# Patient Record
Sex: FEMALE | Race: WHITE | Employment: FULL TIME | URBAN - METROPOLITAN AREA
[De-identification: names, ages, dates, MRNs, and addresses within clinical notes are randomized per-mention and may not be internally consistent; named-entity substitution may affect disease eponyms.]

---

## 2019-04-08 ENCOUNTER — TELEPHONE (OUTPATIENT)
Dept: ADMINISTRATIVE | Age: 44
End: 2019-04-08

## 2019-04-08 NOTE — TELEPHONE ENCOUNTER
Pt is a self-referral and former [facility] pt. Last seen 2015. Pt called to schedule an appt with Dr. Lucie Ahmadi (at her request) . Has a defibulator and has wore a life vest in the past.  States she was diagnosed with CHF 3 yrs ago. Pt was scheduled with on 5/14/19 @ 8:30. Past cardiac hx form scanned.

## 2019-05-14 ENCOUNTER — OFFICE VISIT (OUTPATIENT)
Dept: CARDIOLOGY CLINIC | Age: 44
End: 2019-05-14
Payer: COMMERCIAL

## 2019-05-14 VITALS
SYSTOLIC BLOOD PRESSURE: 136 MMHG | DIASTOLIC BLOOD PRESSURE: 88 MMHG | BODY MASS INDEX: 29.23 KG/M2 | HEIGHT: 63 IN | HEART RATE: 67 BPM | WEIGHT: 165 LBS

## 2019-05-14 DIAGNOSIS — I42.0 NONISCHEMIC DILATED CARDIOMYOPATHY (HCC): Primary | ICD-10-CM

## 2019-05-14 DIAGNOSIS — E66.3 OVER WEIGHT: ICD-10-CM

## 2019-05-14 DIAGNOSIS — Z95.810 ICD (IMPLANTABLE CARDIOVERTER-DEFIBRILLATOR) IN PLACE: ICD-10-CM

## 2019-05-14 PROCEDURE — 99204 OFFICE O/P NEW MOD 45 MIN: CPT | Performed by: INTERNAL MEDICINE

## 2019-05-14 PROCEDURE — 93000 ELECTROCARDIOGRAM COMPLETE: CPT | Performed by: INTERNAL MEDICINE

## 2019-05-14 RX ORDER — MULTIVIT-MIN/IRON/FOLIC ACID/K 18-600-40
2000 CAPSULE ORAL
COMMUNITY

## 2019-05-14 NOTE — PROGRESS NOTES
OFFICE VISIT     PRIMARY CARE PHYSICIAN:      Dilshad Bates DO       ALLERGIES / SENSITIVITIES:      No Known Allergies       REVIEWED MEDICATIONS:        Current Outpatient Medications:     Cholecalciferol (VITAMIN D) 2000 units CAPS capsule, Take 2,000 Units by mouth, Disp: , Rfl:     furosemide (LASIX) 20 MG tablet, Take 1 tablet by mouth daily, Disp: 10 tablet, Rfl: 0    potassium chloride SA (K-DUR;KLOR-CON M) 20 MEQ tablet, Take 1 tablet by mouth daily, Disp: 10 tablet, Rfl: 0    lisinopril (PRINIVIL;ZESTRIL) 2.5 MG tablet, Take 1 tablet by mouth daily (Patient taking differently: Take 10 mg by mouth daily ), Disp: 30 tablet, Rfl: 3    carvedilol (COREG) 6.25 MG tablet, Take 1 tablet by mouth 2 times daily (with meals) (Patient taking differently: Take 25 mg by mouth 2 times daily (with meals) ), Disp: 60 tablet, Rfl: 3    spironolactone (ALDACTONE) 25 MG tablet, Take 0.5 tablets by mouth daily, Disp: 30 tablet, Rfl: 3      S: REASON FOR VISIT:       Chief Complaint   Patient presents with    Congestive Heart Failure     Self referral due to CHF. Pt has ICD, former Dr. Lamar Tomas. Last seen 05/15/15. Wanted to switch. Pt has no cardiac complaints today. Pt just wants to establish here. History of Present Illness:       New referral for CMP, HTN   Had NICMP, 2015, had Live Vest,; s/p Sub-Q  ICD at [facility]   occ mild HALE  No PND or orthopnea   No palpitations, dizzy or syncope.    Active at home   No orthopnea   Try to watch diet   Compliant with all medications       Past Medical History:   Diagnosis Date    [facility]congestive [facility] failure[facility])     echo 5/13/15 EF 18% stage II diastolic    Migraine     Nonischemic dilated cardiomyopathy ([facility] [address] 2015            Past Surgical History:   Procedure Laterality Date    CARDIAC DEFIBRILLATOR PLACEMENT            Family History   Problem Relation Age of Onset    Hypertension Mother     Heart Attack Father           Social History     Tobacco Use    Smoking status: Former Smoker    Smokeless tobacco: Never Used    Tobacco comment: quit 2004   Substance Use Topics    Alcohol use: No     Comment: 12 oz mtn dew daily         Review of Systems:  HEENT: negative for acute visual symptoms or auditory problems, no dysphagia  Constitutional: negative for fever and chills, or significant weight loss  Respiratory: negative for cough, wheezing, or hemoptysis  Cardiovascular: negative for chest pain, palpitations, and +ve dyspnea  Gastrointestinal: negative for abdominal pain, diarrhea, nausea and vomiting  Endocrine: Negative for polyuria and polydyspsia  Genitourinary:negative for dysuria and hematuria  Derm: negative for rash and skin lesion(s)  Neurological: negative for tingling, numbness, weakness, seizures and tremors  Endocrine: negative for polydipsia and polyuria  Musculoskeletal: negative for pain or tenderness  Psychiatric: negative for anxiety, depression, or suicidal ideations         O:  COMPLETE PHYSICAL EXAM:       /88   Pulse 67   Ht 5' 3\" (1.6 m)   Wt 165 lb (74.8 kg)   BMI 29.23 kg/m²       General:   Patient alert, comfortable, no distress. Appears stated age. HEENT:    Pupils equal, no icterus, no nasal drainage, tongue moist.   Neck:              No masses, Thyroid not palpable. Chest:   Normal configuration, non tender. Left mid axillary area Sub-Q ICD +   Lungs:   Clear to auscultation bilaterally, few scattered rhonchi. Cardiovascular:  Regular rhythm, 1/6 systolic murmur, No S3, , no palpable thrills, No elevated JVD, No carotid bruit. Abdomen:  Soft, Non tender, Bowel sounds normal, no pulsatile abdominal aorta, no palpable masses. Extremities:  No edema. Distal pulses palpable. No cyanosis, no clubbing. Skin:   Good turgor, warm and dry, no cyanosis. Musculoskeletal: No joint swelling or deformity. Neuro:   Cranial nerves grossly intact; No focal neurologic deficit.    Psych:   Alert, good mood and effect. REVIEW OF DIAGNOSTIC TESTS:        Electrocardiogram: NSR, anterior ST/T leon   Echo 3/2019   Stress 2016          A/P:   ASSESSMENT / PLAN:    Yolis Vyas was seen today for congestive heart failure. Diagnoses and all orders for this visit:    Nonischemic dilated cardiomyopathy ([facility] [address] - Dx in  2015, EF 20-25% in 8/2015, 25% in 2016, 53 +/-5% on 3/4/2019 Echo - On Coreg, Lisinopril, Aldactone, prn Lasix    ICD (implantable cardioverter-defibrillator) in place 9/25/2015 -[facility]- S-ICD 1 - Follows with Dr Clifford Woods at [facility]    Possible HTN - Stable on current Meds    Over weight - Diet, exercise and weight loss discussed. Other orders  -     EKG 12 Lead       Preventive Cardiology: Low cholesterol diet, regular exercise as tolerate, and gradual weight loss discussed. Monitor BP and heart rates. All questions answered about cardiac diagnoses and cardiac medications. Continue current medications. Compliance with medications and f/u with all physicians discussed. Risk factor modification based on risk profile discussed. Call if any exertional chest pain, short of breath, dizzy or palpitations   Follow up in 12 months or earlier if needed.          [id number] [facility]  [address], [place], [address] [facility]  [phone]

## 2019-08-18 ENCOUNTER — APPOINTMENT (OUTPATIENT)
Dept: GENERAL RADIOLOGY | Age: 44
End: 2019-08-18
Payer: COMMERCIAL

## 2019-08-18 ENCOUNTER — HOSPITAL ENCOUNTER (EMERGENCY)
Age: 44
Discharge: HOME | End: 2019-08-18
Payer: COMMERCIAL

## 2019-08-18 VITALS
RESPIRATION RATE: 16 BRPM | HEART RATE: 70 BPM | TEMPERATURE: 98.3 F | WEIGHT: 165 LBS | DIASTOLIC BLOOD PRESSURE: 75 MMHG | OXYGEN SATURATION: 98 % | SYSTOLIC BLOOD PRESSURE: 128 MMHG | BODY MASS INDEX: 29.23 KG/M2

## 2019-08-18 DIAGNOSIS — S83.90XA SPRAIN OF KNEE, UNSPECIFIED LATERALITY, UNSPECIFIED LIGAMENT, INITIAL ENCOUNTER: Primary | ICD-10-CM

## 2019-08-18 PROCEDURE — 99212 OFFICE O/P EST SF 10 MIN: CPT

## 2019-08-18 PROCEDURE — 73560 X-RAY EXAM OF KNEE 1 OR 2: CPT

## 2019-08-18 RX ORDER — NAPROXEN 500 MG/1
500 TABLET ORAL 2 TIMES DAILY
Qty: 14 TABLET | Refills: 0 | Status: SHIPPED | OUTPATIENT
Start: 2019-08-18 | End: 2020-06-30

## 2019-08-18 ASSESSMENT — PAIN DESCRIPTION - PAIN TYPE: TYPE: ACUTE PAIN

## 2019-08-18 ASSESSMENT — PAIN SCALES - GENERAL: PAINLEVEL_OUTOF10: 5

## 2019-08-18 ASSESSMENT — PAIN DESCRIPTION - DESCRIPTORS: DESCRIPTORS: ACHING

## 2019-08-18 ASSESSMENT — PAIN DESCRIPTION - ORIENTATION: ORIENTATION: RIGHT

## 2019-08-18 ASSESSMENT — PAIN DESCRIPTION - LOCATION: LOCATION: KNEE

## 2019-09-09 RX ORDER — LISINOPRIL 10 MG/1
10 TABLET ORAL DAILY
Qty: 90 TABLET | Refills: 3 | Status: SHIPPED | OUTPATIENT
Start: 2019-09-09 | End: 2020-09-03

## 2019-09-09 RX ORDER — SPIRONOLACTONE 25 MG/1
12.5 TABLET ORAL DAILY
Qty: 45 TABLET | Refills: 3 | Status: SHIPPED | OUTPATIENT
Start: 2019-09-09 | End: 2020-11-16

## 2019-09-09 RX ORDER — CARVEDILOL 25 MG/1
25 TABLET ORAL 2 TIMES DAILY WITH MEALS
Qty: 180 TABLET | Refills: 3 | Status: SHIPPED | OUTPATIENT
Start: 2019-09-09 | End: 2020-11-10

## 2019-09-26 ENCOUNTER — EVALUATION (OUTPATIENT)
Dept: PHYSICAL THERAPY | Age: 44
End: 2019-09-26
Payer: COMMERCIAL

## 2019-09-26 DIAGNOSIS — S83.90XA SPRAIN OF KNEE, INITIAL ENCOUNTER: Primary | ICD-10-CM

## 2019-09-26 PROCEDURE — 97163 PT EVAL HIGH COMPLEX 45 MIN: CPT | Performed by: PHYSICAL THERAPIST

## 2019-09-26 NOTE — PROGRESS NOTES
Therapeutic Exercise  [x] Therapeutic Activity  [x] Neuromuscular Re-education   [] Gait Training  [] Balance Training  [] Aerobic conditioning  [] Manual Therapy  [] Massage/Fascial release   [x] Work/Sport specific activities    [] Pain Neuroscience [x] Cold/hotpack  [] Vasocompression  [] Electrical Stimulation  [] Lumbar/Cervical Traction  [] Ultrasound   [] Iontophoresis: 4 mg/mL Dexamethasone Sodium Phosphate 40-80 mAmin        [x] Instruction in HEP      []  Medication allergies reviewed for use of Dexamethasone Sodium Phosphate 4mg/ml  with iontophoresis treatments. Patient is not allergic. The following CPT codes are likely to be used in the care of this patient: 96302 Therapeutic Exercise , 83502 Therapeutic Activities       Suggested Professional Referral: [x] No  [] Yes:     Patient Education:  [x] Plans/Goals, Risks/Benefits discussed  [x] Home exercise program  Method of Education: [x] Verbal  [x] Demo  [x] Written  Comprehension of Education:  [x] Verbalizes understanding. [x] Demonstrates understanding. [] Needs Review. [] Demonstrates/verbalizes understanding of HEP/Ed previously given. Frequency: 3 days per week for 4-6 weeks    Patient understands diagnosis/prognosis and consents to treatment, plan and goals: [x] Yes    [] No     Thank you for the opportunity to work with your patient. If you have questions or comments, please contact me at [phone]; fax: [phone]. Electronically signed by: Sera Pena PT         Please sign Physician's Certification and return to: [facility]  [facility] [address]  Dept: [phone]  Dept Fax: [phone] Certification / Comments     Frequency/Duration 3 days per week for 4-6 weeks. Certification period from 9/26/2019  to 12/20/2019.     I have reviewed the Plan of Care established for skilled therapy services and certify that the services are required and that they will be provided while the patient is under my care.     Physician's Comments/Revisions:               Physician's Printed Name:                                           [de-identified] Signature:                                                               Date:

## 2019-09-30 ENCOUNTER — TREATMENT (OUTPATIENT)
Dept: PHYSICAL THERAPY | Age: 44
End: 2019-09-30
Payer: COMMERCIAL

## 2019-09-30 DIAGNOSIS — S83.90XA SPRAIN OF KNEE, INITIAL ENCOUNTER: Primary | ICD-10-CM

## 2019-09-30 PROCEDURE — 97110 THERAPEUTIC EXERCISES: CPT | Performed by: PHYSICAL THERAPIST

## 2019-10-01 ENCOUNTER — TREATMENT (OUTPATIENT)
Dept: PHYSICAL THERAPY | Age: 44
End: 2019-10-01
Payer: COMMERCIAL

## 2019-10-01 DIAGNOSIS — S83.90XA SPRAIN OF KNEE, INITIAL ENCOUNTER: Primary | ICD-10-CM

## 2019-10-01 PROCEDURE — 97110 THERAPEUTIC EXERCISES: CPT | Performed by: PHYSICAL THERAPIST

## 2019-10-03 ENCOUNTER — TREATMENT (OUTPATIENT)
Dept: PHYSICAL THERAPY | Age: 44
End: 2019-10-03
Payer: COMMERCIAL

## 2019-10-03 DIAGNOSIS — S83.90XA SPRAIN OF KNEE, INITIAL ENCOUNTER: Primary | ICD-10-CM

## 2019-10-03 PROCEDURE — 97110 THERAPEUTIC EXERCISES: CPT | Performed by: PHYSICAL THERAPIST

## 2019-10-07 ENCOUNTER — TREATMENT (OUTPATIENT)
Dept: PHYSICAL THERAPY | Age: 44
End: 2019-10-07
Payer: COMMERCIAL

## 2019-10-07 DIAGNOSIS — S83.90XA SPRAIN OF KNEE, INITIAL ENCOUNTER: Primary | ICD-10-CM

## 2019-10-07 PROCEDURE — 97110 THERAPEUTIC EXERCISES: CPT | Performed by: PHYSICAL THERAPIST

## 2019-10-08 ENCOUNTER — TREATMENT (OUTPATIENT)
Dept: PHYSICAL THERAPY | Age: 44
End: 2019-10-08
Payer: COMMERCIAL

## 2019-10-08 DIAGNOSIS — S83.90XA SPRAIN OF KNEE, INITIAL ENCOUNTER: ICD-10-CM

## 2019-10-08 PROCEDURE — 97110 THERAPEUTIC EXERCISES: CPT | Performed by: PHYSICAL THERAPIST

## 2019-10-10 ENCOUNTER — TREATMENT (OUTPATIENT)
Dept: PHYSICAL THERAPY | Age: 44
End: 2019-10-10
Payer: COMMERCIAL

## 2019-10-10 DIAGNOSIS — S83.90XA SPRAIN OF KNEE, INITIAL ENCOUNTER: Primary | ICD-10-CM

## 2019-10-10 PROCEDURE — 97110 THERAPEUTIC EXERCISES: CPT | Performed by: PHYSICAL THERAPIST

## 2019-10-10 PROCEDURE — 97530 THERAPEUTIC ACTIVITIES: CPT | Performed by: PHYSICAL THERAPIST

## 2019-10-15 ENCOUNTER — TELEPHONE (OUTPATIENT)
Dept: PHYSICAL THERAPY | Age: 44
End: 2019-10-15

## 2019-10-17 ENCOUNTER — TREATMENT (OUTPATIENT)
Dept: PHYSICAL THERAPY | Age: 44
End: 2019-10-17
Payer: COMMERCIAL

## 2019-10-17 DIAGNOSIS — S83.90XA SPRAIN OF KNEE, INITIAL ENCOUNTER: Primary | ICD-10-CM

## 2019-10-17 PROCEDURE — 97110 THERAPEUTIC EXERCISES: CPT | Performed by: PHYSICAL THERAPIST

## 2019-10-17 PROCEDURE — 97530 THERAPEUTIC ACTIVITIES: CPT | Performed by: PHYSICAL THERAPIST

## 2019-10-21 ENCOUNTER — TREATMENT (OUTPATIENT)
Dept: PHYSICAL THERAPY | Age: 44
End: 2019-10-21
Payer: COMMERCIAL

## 2019-10-21 DIAGNOSIS — S83.90XA SPRAIN OF KNEE, INITIAL ENCOUNTER: Primary | ICD-10-CM

## 2019-10-21 PROCEDURE — 97110 THERAPEUTIC EXERCISES: CPT | Performed by: PHYSICAL THERAPIST

## 2019-10-21 PROCEDURE — 97530 THERAPEUTIC ACTIVITIES: CPT | Performed by: PHYSICAL THERAPIST

## 2019-10-22 ENCOUNTER — TREATMENT (OUTPATIENT)
Dept: PHYSICAL THERAPY | Age: 44
End: 2019-10-22
Payer: COMMERCIAL

## 2019-10-22 DIAGNOSIS — S83.90XA SPRAIN OF KNEE, INITIAL ENCOUNTER: Primary | ICD-10-CM

## 2019-10-22 PROCEDURE — 97530 THERAPEUTIC ACTIVITIES: CPT | Performed by: PHYSICAL THERAPIST

## 2019-10-22 PROCEDURE — 97110 THERAPEUTIC EXERCISES: CPT | Performed by: PHYSICAL THERAPIST

## 2019-10-24 ENCOUNTER — TREATMENT (OUTPATIENT)
Dept: PHYSICAL THERAPY | Age: 44
End: 2019-10-24
Payer: COMMERCIAL

## 2019-10-24 DIAGNOSIS — S83.90XA SPRAIN OF KNEE, INITIAL ENCOUNTER: Primary | ICD-10-CM

## 2019-10-24 PROCEDURE — 97110 THERAPEUTIC EXERCISES: CPT | Performed by: PHYSICAL THERAPIST

## 2019-10-24 PROCEDURE — 97530 THERAPEUTIC ACTIVITIES: CPT | Performed by: PHYSICAL THERAPIST

## 2019-10-28 ENCOUNTER — TREATMENT (OUTPATIENT)
Dept: PHYSICAL THERAPY | Age: 44
End: 2019-10-28
Payer: COMMERCIAL

## 2019-10-28 DIAGNOSIS — S83.90XA SPRAIN OF KNEE, INITIAL ENCOUNTER: Primary | ICD-10-CM

## 2019-10-28 PROCEDURE — 97110 THERAPEUTIC EXERCISES: CPT | Performed by: PHYSICAL THERAPIST

## 2019-10-28 PROCEDURE — 97530 THERAPEUTIC ACTIVITIES: CPT | Performed by: PHYSICAL THERAPIST

## 2020-03-23 NOTE — TELEPHONE ENCOUNTER
Used to get this from [facility] cardiologist but has switched to you - only takes when necessary

## 2020-03-24 RX ORDER — FUROSEMIDE 20 MG/1
20 TABLET ORAL DAILY
Qty: 10 TABLET | Refills: 0 | Status: SHIPPED
Start: 2020-03-24 | End: 2020-03-31

## 2020-03-31 RX ORDER — FUROSEMIDE 20 MG/1
20 TABLET ORAL DAILY PRN
Qty: 90 TABLET | Refills: 3 | Status: SHIPPED
Start: 2020-03-31 | End: 2021-03-09

## 2020-06-30 ENCOUNTER — OFFICE VISIT (OUTPATIENT)
Dept: CARDIOLOGY CLINIC | Age: 45
End: 2020-06-30
Payer: COMMERCIAL

## 2020-06-30 VITALS
BODY MASS INDEX: 29.06 KG/M2 | HEART RATE: 66 BPM | SYSTOLIC BLOOD PRESSURE: 138 MMHG | DIASTOLIC BLOOD PRESSURE: 80 MMHG | HEIGHT: 63 IN | WEIGHT: 164 LBS

## 2020-06-30 PROCEDURE — 93000 ELECTROCARDIOGRAM COMPLETE: CPT | Performed by: INTERNAL MEDICINE

## 2020-06-30 PROCEDURE — 99214 OFFICE O/P EST MOD 30 MIN: CPT | Performed by: INTERNAL MEDICINE

## 2020-06-30 NOTE — PROGRESS NOTES
History   Problem Relation Age of Onset    Hypertension Mother     Heart Attack Father           Social History     Tobacco Use    Smoking status: Former Smoker     Packs/day: 1.50     Years: 20.00     Pack years: 30.00    Smokeless tobacco: Never Used    Tobacco comment: quit 2004   Substance Use Topics    Alcohol use: Yes     Comment: rare; 12 oz mtn dew daily         Review of Systems:  Constitutional: negative for fever and chills, or significant weight loss  HEENT: negative for acute visual symptoms or auditory problems, no dysphagia  Respiratory: negative for cough, wheezing, or hemoptysis  Cardiovascular: negative for chest pain, palpitations, and dyspnea  Gastrointestinal: negative for abdominal pain, diarrhea, nausea and vomiting  Endocrine: Negative for polyuria and polydyspsia  Genitourinary:negative for dysuria and hematuria  Derm: negative for rash and skin lesion(s)  Neurological: negative for tingling, numbness, weakness, seizures and tremors  Endocrine: negative for polydipsia and polyuria  Musculoskeletal: negative for pain or tenderness  Psychiatric: negative for anxiety, depression, or suicidal ideations         O:  COMPLETE PHYSICAL EXAM:       /80 (Site: Right Upper Arm, Position: Sitting, Cuff Size: Large Adult)   Pulse 66   Ht 5' 3\" (1.6 m)   Wt 164 lb (74.4 kg)   BMI 29.05 kg/m²       General:   Patient alert, comfortable, no distress. Appears stated age. HEENT:    Pupils equal, no icterus, no nasal drainage, tongue moist.   Neck:              No masses, Thyroid not palpable. No elevated JVD, No carotid bruit. Chest:   Normal configuration, non tender. ICD site site Pam Garb   Lungs:   Clear to auscultation bilaterally, few scattered   Heart;    Regular, No S, no palpable thrills,    Abdomen:  Soft, Non tender, Bowel sounds normal   Extremities:  No edema. Distal pulses palpable. No cyanosis, no clubbing. Skin:   Good turgor, warm and dry, no cyanosis.     Musculoskeletal: No

## 2020-09-03 RX ORDER — LISINOPRIL 10 MG/1
TABLET ORAL
Qty: 90 TABLET | Refills: 3 | Status: SHIPPED
Start: 2020-09-03 | End: 2021-08-30

## 2020-11-10 RX ORDER — CARVEDILOL 25 MG/1
TABLET ORAL
Qty: 180 TABLET | Refills: 3 | Status: SHIPPED
Start: 2020-11-10 | End: 2021-11-05

## 2020-11-16 RX ORDER — SPIRONOLACTONE 25 MG/1
TABLET ORAL
Qty: 45 TABLET | Refills: 3 | Status: SHIPPED
Start: 2020-11-16 | End: 2021-11-09

## 2021-03-09 RX ORDER — FUROSEMIDE 20 MG/1
TABLET ORAL
Qty: 90 TABLET | Refills: 3 | Status: SHIPPED
Start: 2021-03-09 | End: 2022-03-03

## 2021-07-07 ENCOUNTER — HOSPITAL ENCOUNTER (OUTPATIENT)
Dept: DIABETES SERVICES | Age: 46
Setting detail: THERAPIES SERIES
Discharge: HOME | End: 2021-07-07

## 2021-07-07 VITALS — WEIGHT: 160 LBS | BODY MASS INDEX: 28.35 KG/M2 | HEIGHT: 63 IN

## 2021-07-07 PROCEDURE — 97802 MEDICAL NUTRITION INDIV IN: CPT

## 2021-07-07 ASSESSMENT — PROBLEM AREAS IN DIABETES QUESTIONNAIRE (PAID)
FEELING THAT DIABETES IS TAKING UP TOO MUCH OF YOUR MENTAL AND PHYSICAL ENERGY EVERY DAY: 0
PAID-5 TOTAL SCORE: 0
FEELING SCARED WHEN YOU THINK ABOUT LIVING WITH DIABETES: 0
FEELING DEPRESSED WHEN YOU THINK ABOUT LIVING WITH DIABETES: 0
COPING WITH COMPLICATIONS OF DIABETES: 0
WORRYING ABOUT THE FUTURE AND THE POSSIBILITY OF SERIOUS COMPLICATIONS: 0

## 2021-07-07 NOTE — PROGRESS NOTES
Diabetes Self-Management Education Record    Participant Name: Bleen Nieves  Referring Provider: Wendy Villarreal DO  Assessment/Evaluation Ratings:  1=Needs Instruction   4=Demonstrates Understanding/Competency  2=Needs Review   NC=Not Covered    3=Comprehends Key Points  N/A=Not Applicable  Topics/Learning Objectives Pre-session Assess Date:  Instructor initials/date  7/7/21KC Instr. Date    Instructor initials/date  7/7/21KC Follow-up Post- session Eval Comments   Diabetes disease process & Treatment process:   -Define type of diabetes in simple terms.  - Describe the ABCs of  diabetes management  -Identify own type of diabetes  -Identify lifestyle changes/treatment options  -other:  2 [x] All     []  []  []  []  []  3 7/7/21KC: tyoe 2 dm dx 2020   Developing strategies for Healthy coping/psychosocial issues:    -Describe feelings about living with diabetes  -Identify coping strategies and sources of stress  -Identify support needed & support network available  -Complete PAID-5 Diabetes questionnaire 1 [] All     []  []  []    []   7/7/21KC: paid 5=0   Prevention, detection & treatment of Chronic complications:    -Identify the prevention, detection and treatment for complications including immunizations, preventive eye, foot, dental and renal exams as indicated per the participant's duration of diabetes and health status.  -Define the natural course of diabetes and the relationship of blood glucose levels to long term complications of diabetes.   2 [] All     []            []      Prevention, detection & treatment of acute complications:    -State the causes,signs & symptoms of hyper & hypoglycemia, and prevention & treatment strategies.   -Describe sick day guidelines  DKA /indications for ketone testing &  when to call physician  2 [] All     []      []        -Identify severe weather/situation crisis  & diabetes supplies management  []      Using medications safely:   -State effects of diabetes medicines on blood glucose levels;  -List diabetes medication taken, action & side effects n/a [] All     []  []   7/7/21KC: not on medications   Insulin/Injectables/glucagon  -Name appropriate injection sites; proper storage; supplies needed;     []       Demonstrate proper technique  []      Monitoring blood glucose, interpreting and using results:   -Identify the purpose of testing   -Identify recommended & personal blood glucose targets & HgbA1C target levels  -State the Importance of logging blood glucose levels for pattern recognition;   -State benefits of reading/using pt generated health data  -Verbalize safe lancet disposal n/a [] All     []  []    []  []  []   7/7/21KC: not ordered a glucometer   -Demonstrate proper testing technique  []      Incorporating physical activity into lifestyle:   -State effect of exercise on blood glucose levels;   -State benefits of regular exercise;   -Define safety considerations/food choices if needed.  -Describe contraindications/maintenance of activity. 2 [] All     []  []    []  []      Incorporating nutritional management into lifestyle:   -Describe effect of type, amount & timing of food on blood glucose  -Describe methods for preparing and planning healthy meals  -Correctly read food labels  -Name 3 foods high in Carbohydrate 2 [x] All       []    []    []  []  4 7/7/21KC: Instructed pt on 1200 calorie diet with 9 total carbohydrate servings per day. 2 carbohydrate choices for breakfast, 2 carbohydrate choices for lunch, 2 carbohydrate choices for dinner, 1 carbohydrate choices for AM/PM/HS snack. Pt consumes excessive simplified carbohydrates however has been making changes to incorporate more complex carbs into diet. Discussed recommended weight goals and water intakes. Discussed different types of carbohydrates and choosing higher quality carbohydrates with more fiber. Educated pt on lean proteins and low fat.  Educated pt on label reading and how to apply grams of total carbohydrates into carbohydrate servings to comply with meal plan. Encouraged pt to use measuring cups for portion control. Instructed pt to call with any questions.    -Plan a carbohydrate-controlled meal based on individualized meal plan  -Demonstrate CHO counting/portion control   []  []      Developing strategies for problem solving to promote health/change behavior. -Identify 7 self-care behaviors; Personal health risk factors; Benefits, challenges & strategies for behavioral change and set an individualized goal selection. 1       []     []Nutrition  []Monitoring  []Exercise  []Medication  []Other     Identified Barriers to learning/adherence to self management plan:    None  []  other    Instruction Method:  Lecture/Discussion and Handouts    Supporting Education Materials/Equipment Provided: Meal Plan and Nutritional Packet   []Kazakh materials       [] services     []Other:      Encounter Type Date Attended Start Time End Time Comments No Show Dates   Assessment          Session 1         Session 2        1:1 DSMES          In person Follow-up         Gestational Diabetes         Individual MNT 7/7/21KC 1330 1430 inperson    Meter Instrx        Insulin Instrx           Additional Comments: [] Pt seen individually due to Covid-19 Safety precautions and no group session available.         Date:   Follow-up goal attainment based on patients initial DSMES goal    Dr Notified by [] EMR []Fax        []Post class Hgb A1C  []Medication compliance   []Plate method/meal plan compliance   []Able to state the number of Carbohydrate servings eaten at B,L,D   []Testing blood glucose as prescribed by PCP   []Exercise Routine   []Other:   []Other:     []Patient lost to follow-up  Dr Brook Concepcion by []EMR []Fax     Personal Support Plan:      [] Keep all scheduled doctor appointments   [] Make and keep appointments with specialists (foot, eye, dentist) as recommended   [] Consult my pharmacist about all new medications or to ask any medication questions   [] Get tested for sleep apnea   [] Seek help for:   [] Make an appointment with:   [] Attend smoking cessation classes or call 1-800-QUIT-NOW  [] Attend Diabetes Support Group   [] Use diabetes magazines, books, or credible web-sites like the ADA for more information  [] Increase exercise at home or join an exercise program:   [] Other:

## 2021-07-07 NOTE — LETTER
[facility]) - Diabetes Education    2021     Re:     Camilla Walker  :  1975    Dear Dr. Verma Portal: Thank you for referring your patient, Camilla Walker, to Diabetes Education Medical Nutrition Therapy. Your patient was here on 21, and the following were addressed:        [x] Nutrition as Related to Diabetes    [x] Carbohydrate Counting     [x] Free Food List    [x] Combination Foods    [x] Fast Foods    [x] Weighing and measuring    [x] Meal Planning    [x] Using Food Labels - Label Reading  [] Diabetes Education Class Schedule    [x] Diet Instruction       [x]  Diet instructed provided on: 1200 calorie ADA    Upon completion of this session, the following evaluation of your patients progress was made:    ASSESSMENT:  [x]  verbalizes understanding of diet principles  [x]  understands the relationship between diet and health  [x]  identifies proper food choices  [x]  identifies needed diet changes  [x]  expresses intentions to comply with diet guidelines  [x]  able to provide correct answers when asked    GOAL:  [x]  to follow individual meal plan  [x]  to weigh and measure food portions  [x]  to call with further questions  []  to attend diabetes education class sessions 1 and/or 2    PATIENT EDUCATION MATERIALS PROVIDED:  [x]  plate carb counting meal plan  [x]  low fat guidelines  [x]  carb counting sheet  [x]  low sodium guidelines  []  Other:      COMMENTS:  21KC: Instructed pt on 1200 calorie diet with 9 total carbohydrate servings per day. 2 carbohydrate choices for breakfast, 2 carbohydrate choices for lunch, 2 carbohydrate choices for dinner, 1 carbohydrate choices for AM/PM/HS snack. Pt consumes excessive simplified carbohydrates however has been making changes to incorporate more complex carbs into diet. Discussed recommended weight goals and water intakes. Discussed different types of carbohydrates and choosing higher quality carbohydrates with more fiber.  Educated pt on lean proteins and low fat. Educated pt on label reading and how to apply grams of total carbohydrates into carbohydrate servings to comply with meal plan. Encouraged pt to use measuring cups for portion control. Instructed pt to call with any questions. Patient is encouraged to call with further questions or call and re-schedule other sessions within a year from original date. Thank you for referring this patient to our program.  Please do not hesitate to call if you have any questions at ( (TASHA or SHANIQUE) or [phone] ([address] [place] [address]).         Sincerely,         Registered Dietitian Nutritionists:          []  BETH Kinney          [x]   [place] Borne MS BETH IRBY   []  BETH Saldivar  []   BETH Ridley           Diabetes

## 2021-08-30 RX ORDER — LISINOPRIL 10 MG/1
TABLET ORAL
Qty: 90 TABLET | Refills: 3 | Status: SHIPPED
Start: 2021-08-30 | End: 2021-12-15 | Stop reason: SDUPTHER

## 2021-09-28 ENCOUNTER — TELEPHONE (OUTPATIENT)
Dept: CARDIOLOGY CLINIC | Age: 46
End: 2021-09-28

## 2021-09-28 NOTE — TELEPHONE ENCOUNTER
Dr Joe Hagen,    Patient called and her gynecologist wants to place patient on birth control due to heavy cycles. They want to know from a cardiac standpoint will it be safe for her. Please advise,  Katerina Nunez patient re:  Above.   Coco Perales

## 2021-11-05 RX ORDER — CARVEDILOL 25 MG/1
TABLET ORAL
Qty: 180 TABLET | Refills: 3 | Status: SHIPPED
Start: 2021-11-05 | End: 2021-12-15 | Stop reason: SDUPTHER

## 2021-11-09 RX ORDER — SPIRONOLACTONE 25 MG/1
TABLET ORAL
Qty: 45 TABLET | Refills: 3 | Status: SHIPPED
Start: 2021-11-09 | End: 2021-12-15 | Stop reason: SDUPTHER

## 2021-12-04 NOTE — PROGRESS NOTES
[facility] - Office Visit Follow-up    Date of Consultation: 12/15/2021    HISTORY OF PRESENT ILLNESS:   Patient is a 48year old WF who is known to Dr. Casandra Escobar. She also follows with Cardiology at [facility]. She is here today for annual follow up. Patient states she has been doing well since time of last office evaluation. No noted weight gain, with noted purposeful seven pound weight loss (164 lbs --> 157 lbs). She continues to only take her Lasix as needed, which is rare per the patient. She denies any complaints of chest discomfort at rest or on exertion, shortness of breath at rest or on exertion, nausea, emesis, diaphoresis, dizziness, palpitations, near-syncope or syncope. She denies paroxysmal nocturnal dyspnea, orthopnea or peripheral edema. She was recently initiated on statin therapy by her PCP for hyperlipidemia -- states she has been tolerating the medication well. She admits to medication compliance. Please note: past medical records were reviewed per electronic medical record (EMR) - see detailed reports under Past Medical/ Surgical History. PAST MEDICAL HISTORY:    1. Hypertension. 2. Hyperlipidemia, on statin therapy. 3. Vitamin D deficiency. 4. 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 Diagnosed in 2015. Undetermined etiology. 5. Chronic HFrEF with recovered LV function. 6. S/p ICD placement in 2015 at [facility]. 7. Former tobacco smoker. CARDIAC TESTING    KATHLEEN (Dr. Shannan Barry, 5/2015)  Summary   No previous echo for comparison. Technically adequate study. Severely reduced left ventricular systolic function. Bowing of the interatrial septum into the right atrium, consistent with   increased left atrial pressure. Right ventricle global systolic function is mildly reduced . Mild to moderate mitral regurgitation is present. Physiologic and/or trace aortic regurgitation is noted. Focal thickening of the right coronary cusp of the aortic valve. Mild tricuspid regurgitation. TTE (Dr. Shannan Barry, 8/2015)    Summary   Compared to prior echo, no changes noted. Technically adequate study. Left ventricle is severely enlarged . Normal left ventricular wall thickness. Ejection fraction is visually estimated at 20-25%. Moderate global wall hypokinesis   There is doppler evidence of stage II diastolic dysfunction. Bowing of interatrial septum into the right atrium which consistent with   increased left atrial pressure. Mild mitral regurgitation is present. TTE ([facility], 6/2016)  CONCLUSIONS:   - Exam indication: Initial evaluation of Heart Failure   - The left ventricle is dilated. Left ventricular systolic function is severely   decreased. EF = 25 ± 5% (visual est.) Baseline left ventricular diastolic function    is consistent with abnormal relaxation (stage 1). - The right ventricle is normal in size. Right ventricular systolic function is   low normal.   - There a few bubbles that cross the IAS after valsalva manuever, no definitive   shunt visualized. - The patient has not had a prior CC echocardiographic exam for comparison. Pharmacologic Stress Test ([facility], 11/2016)  CONCLUSIONS:    1. Perfusion study: Normal Study.    2. Functional capacity N/A (pharmacological).  3. Left ventricle is normal in size. the left ventricle systolic   function is normal.    4. Rest LVEF is 55 %. The Stress LVEF is 55 %.  5. Right ventricle is normal in size The right ventricle systolic   function is normal.    6. No evidence of scarred Myocardium.    7. There is no scintigraphic evidence for inducible ischemia.    8. This is a low risk scan. Peak  bpm. (70 % MPHR)   Peak  mmHg/78 mmHg   Patient experienced no symptoms during stress. Stress ECG normal sinus rhythm and normal ST segment response. Stress complications: none. The left ventricular cavity size is unchanged with stress.      TTE ([facility], 10/2017)  CONCLUSIONS:   - Exam indication: Cardiomyopathy   - The left ventricle is normal in size. Left ventricular systolic function is   moderately decreased. EF = 39 ± 5% (2D 4-ch.)   - The right ventricle is normal in size. Right ventricular systolic function is   normal.   - There are no significant valvular abnormalities. - The interatrial septum is mobile. There is suggestion of a left to right shunt   as detected by Doppler.   - Exam was compared with the prior  echocardiographic exam performed on   6/7/2016. Interval improvement in EF from 25% on prior study. TTE ([facility], 2018)  CONCLUSIONS:   - Exam indication: Evaluation of known heart failure to guide therapy   - The left ventricle is normal in size. Left ventricular systolic function is   mildly decreased. EF = 52 ± 5% (2D biplane) Grade I left ventricular diastolic   dysfunction.   - The right ventricle is normal in size. Right ventricular systolic function is   normal.   - Exam was compared with the prior  echocardiographic exam performed on   10/20/2017. Significantly improved LV function compared to last exam.     TTE ([facility], 3/2019)  CONCLUSIONS:   - Exam indication: Evaluation of known heart failure to guide therapy   - The left ventricle is normal in size. Left ventricular systolic function is   mildly decreased. EF = 53 ± 5% (2D biplane) Grade I left ventricular diastolic   dysfunction.   - The right ventricle is normal in size. Right ventricular systolic function is   normal.   - There are no significant valvular abnormalities.   - Exam was compared with the prior  echocardiographic exam performed on   02/23/2018. No significant change. PAST SURGICAL HISTORY:    Past Surgical History:   Procedure Laterality Date    CARDIAC DEFIBRILLATOR PLACEMENT         HOME MEDICATIONS:  Prior to Admission medications    Medication Sig Start Date End Date Taking?  Authorizing Provider   spironolactone (ALDACTONE) 25 MG tablet TAKE ONE-HALF (1/2) TABLET DAILY 12/15/21  Yes [facility] GLADIS Rodriguez   carvedilol (COREG) 25 MG tablet TAKE 1 TABLET TWICE A DAY WITH MEALS 12/15/21  Yes [facility] GLADIS Rodriguez   lisinopril (PRINIVIL;ZESTRIL) 10 MG tablet TAKE 1 TABLET DAILY 12/15/21  Yes [facility] GLADIS Rodriguez   potassium chloride (KLOR-CON M) 20 MEQ extended release tablet Take 1 tablet by mouth daily 12/15/21  Yes [facility] GLADIS Rodriguez   furosemide (LASIX) 20 MG tablet TAKE 1 TABLET DAILY AS NEEDED 3/9/21  Yes Natalya Cox MD   Cholecalciferol (VITAMIN D) 2000 units CAPS capsule Take 2,000 Units by mouth   Yes Historical Provider, MD   rosuvastatin (CRESTOR) 20 MG tablet  9/18/21   Historical Provider, MD   TRI-LO-GENESIS 0.18/0.215/0.25 MG-25 MCG TABS take 1 tablet by mouth once daily 12/5/21   Historical Provider, MD       CURRENT MEDICATIONS:      Current Outpatient Medications:     spironolactone (ALDACTONE) 25 MG tablet, TAKE ONE-HALF (1/2) TABLET DAILY, Disp: 45 tablet, Rfl: 3    carvedilol (COREG) 25 MG tablet, TAKE 1 TABLET TWICE A DAY WITH MEALS, Disp: 180 tablet, Rfl: 3    lisinopril (PRINIVIL;ZESTRIL) 10 MG tablet, TAKE 1 TABLET DAILY, Disp: 90 tablet, Rfl: 3    furosemide (LASIX) 20 MG tablet, TAKE 1 TABLET DAILY AS NEEDED, Disp: 90 tablet, Rfl: 3    Cholecalciferol (VITAMIN D) 2000 units CAPS capsule, Take 2,000 Units by mouth, Disp: , Rfl:     potassium chloride SA (K-DUR;KLOR-CON M) 20 MEQ tablet, Take 1 tablet by mouth daily (Patient taking differently: Take 20 mEq by mouth daily Only with Lasic), Disp: 10 tablet, Rfl: 0      ALLERGIES:  Patient has no known allergies.     SOCIAL HISTORY:    Social History     Socioeconomic History    Marital status:      Spouse name: Not on file    Number of children: Not on file    Years of education: Not on file    Highest education level: Not on file   Occupational History    Not on file   Tobacco Use    Smoking status: Former Smoker     Packs/day: 1.50     Years: 20.00     Pack years: 30.00    Smokeless tobacco: Never Used    Tobacco comment: quit 2004   Vaping Use    Vaping Use: Never used   Substance and Sexual Activity    Alcohol use: Yes     Comment: rare; 12 oz mtn dew daily    Drug use: No    Sexual activity: Not on file   Other Topics Concern    Not on file   Social History Narrative    Not on file     Social Determinants of Health     Financial Resource Strain:     Difficulty of Paying Living Expenses: Not on file   Food Insecurity:     Worried About Running Out of Food in the Last Year: Not on file    Kel of Food in the Last Year: Not on file   Transportation Needs:     Lack of Transportation (Medical): Not on file    Lack of Transportation (Non-Medical): Not on file   Physical Activity:     Days of Exercise per Week: Not on file    Minutes of Exercise per Session: Not on file   Stress:     Feeling of Stress : Not on file   Social Connections:     Frequency of Communication with Friends and Family: Not on file    Frequency of Social Gatherings with Friends and Family: Not on file    Attends Mosque Services: Not on file    Active Member of [address] [place] [address] or Organizations: Not on file    Attends Club or Organization Meetings: Not on file    Marital Status: Not on file   Intimate Partner Violence:     Fear of Current or Ex-Partner: Not on file    Emotionally Abused: Not on file    Physically Abused: Not on file    Sexually Abused: Not on file   Housing Stability:     Unable to Pay for Housing in the Last Year: Not on file    Number of [place] in the Last Year: Not on file    Unstable Housing in the Last Year: Not on file       FAMILY HISTORY:   Family History   Problem Relation Age of Onset    Hypertension Mother     Heart Attack Father          REVIEW OF SYSTEMS:     · Constitutional: Denies fevers, chills, night sweats, and generalized fatigue. Denies significant weight gain. · HEENT: Denies headaches, nose bleeds, rhinorrhea, sore throat. Denies blurred vision.  Denies dysphagia, odynophagia. · Musculoskeletal: Denies falls, pain to BLE with ambulation. Denies muscle weakness. · Neurological: Denies dizziness or lightheadedness, numbness and tingling. Denies focal neurological deficits. · Cardiovascular: Denies chest pain, palpitations, diaphoresis. Denies near syncope, syncope. Denies PND, orthopnea, peripheral edema. · Respiratory: Denies shortness of breath at rest or with exertion. Denies cough, hemoptysis. · Gastrointestinal: Denies abdominal pain, nausea/vomiting, diarrhea and constipation, black/bloody, and tarry stools. · Genitourinary: Denies dysuria and hematuria. · Hematologic: Denies excessive bruising or bleeding. · Endocrine: Denies excessive thirst. Denies intolerance to hot and cold. PHYSICAL EXAM:   /64 (Site: Right Upper Arm, Position: Sitting, Cuff Size: Medium Adult)   Ht 5' 3\" (1.6 m)   Wt 157 lb 3.2 oz (71.3 kg)   BMI 27.85 kg/m²   CONST:  Well developed, well nourished WF who appears stated age. Awake, alert, cooperative, no apparent distress. HEENT:   Head- Normocephalic, atraumatic. Eyes- Conjunctivae pink, anicteric. Neck-  No stridor, trachea midline, no apparent jugular venous distention. CHEST: Chest symmetrical and non-tender to palpation. No accessory muscle use or intercostal retractions. RESPIRATORY: Lung sounds - clear throughout fields. No wheezing, rales or rhonchi. CARDIOVASCULAR:     No noted carotid bruit. Heart Ausculation- Regular rate and rhythm, no apparent murmur. PV: No lower extremity edema. Pedal pulses palpable, no clubbing or cyanosis. ABDOMEN: Soft, non-tender to light palpation. Bowel sounds present. MS: Good muscle strength and tone. No atrophy or abnormal movements. SKIN: Warm and dry. No statis dermatitis or ulcers. NEURO / PSYCH: Oriented to person, place and time. Speech clear and appropriate. Follows all commands. Pleasant affect. DATA:    EKG:  Reviewed.   Final read as noted under Cardiology tab. FASTING LIPID PANEL:  Lab Results   Component Value Date    CHOL 211 05/13/2015    HDL 82 05/13/2015    LDLCALC 103 05/13/2015    TRIG 128 05/13/2015       ASSESSMENT:  · Chronic heart failure with recovered LV function. Appears euvolemic on exam, with no weight gain since last OV. · Reported non-ischemic, dilated cardiomyopathy. · S/p ICD placement in 2015 at [facility]. Follows with EP at [facility]. · Hypertension, well-controlled. · Hyperlipidemia, now on statin therapy. Follows with PCP. · Former tobacco smoker. · Vitamin D deficiency. PLAN:   · Patient requesting referral to follow locally with EP regarding her device / ICD management. · Continue current cardiac medications the same at this time. · Medication refills sent. · Patient states her PCP follows routine blood work, including lipid panel. · Consider need for repeat TTE at time of next office visit. Will defer at this time as patient is asymptomatic / stable from a cardiac perspective. · Follow-up in one year with Dr. Amber Acosta, or sooner if needed. The patient's current medication list, allergies, problem list, recent labs and diagnostic testing were reviewed at today's visit.       Karin Teague, [address] [place] [address], Jairo Salas  Cardiology    Electronically signed by Ct Wayne PA-C on 12/15/2021 at 3:08 PM

## 2021-12-15 ENCOUNTER — OFFICE VISIT (OUTPATIENT)
Dept: CARDIOLOGY CLINIC | Age: 46
End: 2021-12-15
Payer: COMMERCIAL

## 2021-12-15 VITALS
SYSTOLIC BLOOD PRESSURE: 122 MMHG | WEIGHT: 157.2 LBS | DIASTOLIC BLOOD PRESSURE: 64 MMHG | HEART RATE: 85 BPM | BODY MASS INDEX: 27.85 KG/M2 | HEIGHT: 63 IN

## 2021-12-15 DIAGNOSIS — I42.9 CARDIOMYOPATHY, UNSPECIFIED TYPE (HCC): Primary | ICD-10-CM

## 2021-12-15 PROCEDURE — 93000 ELECTROCARDIOGRAM COMPLETE: CPT | Performed by: INTERNAL MEDICINE

## 2021-12-15 PROCEDURE — 99213 OFFICE O/P EST LOW 20 MIN: CPT | Performed by: PHYSICIAN ASSISTANT

## 2021-12-15 RX ORDER — SPIRONOLACTONE 25 MG/1
TABLET ORAL
Qty: 45 TABLET | Refills: 3 | Status: SHIPPED | OUTPATIENT
Start: 2021-12-15

## 2021-12-15 RX ORDER — CARVEDILOL 25 MG/1
TABLET ORAL
Qty: 180 TABLET | Refills: 3 | Status: SHIPPED | OUTPATIENT
Start: 2021-12-15

## 2021-12-15 RX ORDER — LISINOPRIL 10 MG/1
TABLET ORAL
Qty: 90 TABLET | Refills: 3 | Status: SHIPPED | OUTPATIENT
Start: 2021-12-15

## 2021-12-15 RX ORDER — POTASSIUM CHLORIDE 20 MEQ/1
20 TABLET, EXTENDED RELEASE ORAL DAILY
Qty: 10 TABLET | Refills: 0 | Status: SHIPPED | OUTPATIENT
Start: 2021-12-15

## 2021-12-15 RX ORDER — ROSUVASTATIN CALCIUM 20 MG/1
TABLET, COATED ORAL
COMMUNITY
Start: 2021-09-18

## 2021-12-15 RX ORDER — NORGESTIMATE AND ETHINYL ESTRADIOL 7DAYSX3 LO
KIT ORAL
COMMUNITY
Start: 2021-12-05

## 2022-02-18 ENCOUNTER — OFFICE VISIT (OUTPATIENT)
Dept: NON INVASIVE DIAGNOSTICS | Age: 47
End: 2022-02-18
Payer: COMMERCIAL

## 2022-02-18 VITALS
WEIGHT: 165.4 LBS | SYSTOLIC BLOOD PRESSURE: 126 MMHG | HEART RATE: 71 BPM | BODY MASS INDEX: 29.3 KG/M2 | RESPIRATION RATE: 18 BRPM | HEIGHT: 63 IN | DIASTOLIC BLOOD PRESSURE: 88 MMHG

## 2022-02-18 DIAGNOSIS — I42.9 CARDIOMYOPATHY, UNSPECIFIED TYPE (HCC): Primary | ICD-10-CM

## 2022-02-18 PROCEDURE — 99205 OFFICE O/P NEW HI 60 MIN: CPT | Performed by: STUDENT IN AN ORGANIZED HEALTH CARE EDUCATION/TRAINING PROGRAM

## 2022-02-18 PROCEDURE — 93000 ELECTROCARDIOGRAM COMPLETE: CPT | Performed by: STUDENT IN AN ORGANIZED HEALTH CARE EDUCATION/TRAINING PROGRAM

## 2022-02-18 NOTE — PROGRESS NOTES
[facility] CARDIOLOGY  CARDIAC ELECTROPHYSIOLOGY DEPARTMENT/DIVISION OF CARDIOLOGY  Consultation Report  PATIENT: Joseline Springer  MEDICAL RECORD NUMBER: [id number]  DATE OF SERVICE:  2/18/2022  ATTENDING ELECTROPHYSIOLOGIST:  Shadi Antoine D.O.  REFERRING PHYSICIAN: Chano Joyce PA-C and Tavia Angeles DO  CHIEF COMPLAINT: ICD management    HPI: Joseline Springer is a 50 y.o. female with a history of HFpEF-recovered/nonischemic 2/2 possible viral myocarditis sp [facility] S-ICD (DOI: 9/25/15 at [facility]), HTN, and overweight. Her medications include coreg 25 mg BID, lasix 20 mg daily, lisinopril 10 mg daily, spironolactone 25 mg daily, and crestor 20 mg daily. In 2015, she was diagnosed with NICM (EF = 20%) and had [facility] S-ICD primary prevention ICD implanted at [facility]. Her LVEF recovered (EF = 55%). However, in 2017, her LVEF reduced again to 39%. The following year, her LVEF recovered (LVEF = 52%). She desired removal of S-ICD, but Dr. 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 She presents today, 2/18/22; to transfer EP care to my office. She denies any complaints at this time. Prior cardiac testing:   · TTE (3/2019 at [facility]): LVEF = 53%, grade I LVDD  · TTE (2018 at [facility]): LVEF = 52% grade I LVDD   · TTE (10/2017 at [facility]): LVEF = 39%. · Pharm Nuc Stress (11/2016 at [facility]): LVEF = 55%, normal perfusion  · TTE (6/2016 at [facility]): LVEF = 25%. · 12 hour Holter (8/2015 at [facility]): SR with episodes of sinus arrhythmia, mean HR 87 bpm, VE rare, episodes of NSVT (longest: 5 beats at 128 bpm; fastest: 151 bpm for 4 beats), and rare SVE.    · TTE (8/2015 at [facility]): LVEF=20-25%, Moderate global wall hypokinesis, mild MR    Past Medical History:   Diagnosis Date    CHF (congestive heart failure) ([email or web address]     echo 5/13/15 EF 59% stage II diastolic    Migraine     Nonischemic dilated cardiomyopathy ([email or web address] 2015     Past Surgical History:   Procedure Laterality Date    CARDIAC DEFIBRILLATOR PLACEMENT        Family History   Problem Relation Age of Onset    Hypertension Mother     Heart Attack Father      There is no family history of sudden cardiac arrest    Social History     Tobacco Use    Smoking status: Former Smoker     Packs/day: 1.50     Years: 20.00     Pack years: 30.00    Smokeless tobacco: Never Used    Tobacco comment: quit 2004   Substance Use Topics    Alcohol use: Yes     Comment: rare; 1 cup of coffee a day        Current Outpatient Medications   Medication Sig Dispense Refill    rosuvastatin (CRESTOR) 20 MG tablet       TRI-LO-GENESIS 0.18/0.215/0.25 MG-25 MCG TABS take 1 tablet by mouth once daily      spironolactone (ALDACTONE) 25 MG tablet TAKE ONE-HALF (1/2) TABLET DAILY 45 tablet 3    carvedilol (COREG) 25 MG tablet TAKE 1 TABLET TWICE A DAY WITH MEALS 180 tablet 3    lisinopril (PRINIVIL;ZESTRIL) 10 MG tablet TAKE 1 TABLET DAILY 90 tablet 3    potassium chloride (KLOR-CON M) 20 MEQ extended release tablet Take 1 tablet by mouth daily 10 tablet 0    furosemide (LASIX) 20 MG tablet TAKE 1 TABLET DAILY AS NEEDED 90 tablet 3    Cholecalciferol (VITAMIN D) 2000 units CAPS capsule Take 2,000 Units by mouth       No current facility-administered medications for this visit. No Known Allergies    ROS:   Constitutional: Negative for fever, activity change and appetite change. HENT: Negative for epistaxis. Eyes: Negative for diploplia, blurred vision. Respiratory: Negative for cough, chest tightness, shortness of breath and wheezing. Cardiovascular: pertinent positives in HPI  Gastrointestinal: Negative for abdominal pain and blood in stool. Genitourinary: Negative for hematuria and difficulty urinating. Musculoskeletal: Negative for myalgias and gait problem. Skin: Negative for color change and rash. Neurological: Negative for syncope and light-headedness. Psychiatric/Behavioral: Negative for confusion and agitation. The patient is not nervous/anxious. Heme: no bleeding disorders, no melena or hematochezia  All other review of systems are negative     PHYSICAL EXAM:  VS: Blood pressure 126/88, pulse 71, resp. rate 18, height 5' 3\" (1.6 m), weight 165 lb 6.4 oz (75 kg). Constitutional: Well-developed, no acute distress, well groomed  Eyes: conjunctivae normal, no xanthelasma   Ears, Nose, Throat: oral mucosa moist, no cyanosis   Neck: supple, no JVD, no bruits, no thyromegaly   CV: normal rate, regular rhythm,  no murmurs, rubs, or gallops.  PMI is nondisplaced, Peripheral pulses normal including carotid auscultation, no noted aortic bruit, bilateral femoral and pedal pulses are normal in quality  Lungs: clear to auscultation bilaterally, normal respiratory effort without used of accessory muscles, no wheezes  Abdomen: soft, non-tender, bowel sounds present, no masses or hepatomegaly   Extremities: no digital clubbing, no edema   Skin: warm, no rashes   Neuro/Psych: A&O x 3, normal mood and affect    Cardiac testing today:   · EK22: SR at 71 bpm, TWI in V1-V2  · Device Interrogation/Reprogramming (22)  · Make/Model: BSCI A209  · DOI: 9/25/15  · Battery: 28%  · Tachy therapy:  · Shock zone: 250 bpm  · Conditional zone: 200 bpm  · Post shock pacing and SMART pass ON  · Lead configuration: sensing = alternate; shock polarity = STD  · Lead function: impedance = 65 ohms  · Lead programming: gain setting = 1x  · Arrhythmias: none  · Reprogramming included: none  · Overall device function is normal  · All device programmable settings were evaluated per above and in the scanned document, along with iterative adjustments (capture thresholds) to assess and select the most appropriate final programming to provide for consistent delivery of the appropriate therapy and to verify function of the device. Assessment/Plan:  1. HFpEF-recovered/nonischemic 2/2 possible viral myocarditis sp [facility] S-ICD (DOI: 9/25/15 at [facility])  - In 2015, she was diagnosed with NICM (EF = 20%) and had [facility] S-ICD primary prevention ICD implanted at [facility]. Her LVEF recovered (EF = 55%). However, in 2017, her LVEF reduced again to 39%. The following year, her LVEF recovered (LVEF = 52%). She desired removal of S-ICD, but Dr. 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 -Patient to transfer remote monitoring to my office. Recommend remote check every 91 days.  -Recommend TTE to reassess LVEF as last LVEF assessment was 2019.  -Recommend she discuss family history with family to clarify. Will need to consider genetic testing and possibly cardiac MRI in future.  -No medication changes at this time. Recommend she continue Cardiology care at [facility], but if desires can transfer to [facility] in future.  -Follow-up with my office in 3 months. I spent a total of 65 minutes reviewing previous notes, test results, and face to face with the patient discussing the diagnosis and importance of compliance with the treatment plan as well as documenting on the day of the visit. Time of the day of service includes:  · Preparing to see the patient (eg. Review of the medical record, such as tests). · Obtaining and/or reviewing separately obtained history. · Ordering prescription medications, tests, and/or procedures. · Communicating results to the patient/family/caregiver. · Counseling/educating the patient/family/caregiver. · Documenting clinical information in the patients electronic record. · Coordination of care for the patient.   · Performing a medical appropriate exam and/or evaluation. Thank you for allowing me to participate in your patient's care. Please call me if there are any questions. Danielle Alarcon D.O.   Cardiac Electrophysiology  [address] [place] [address]  2/18/22     CC: Dr Darlene Faulkner

## 2022-02-18 NOTE — PATIENT INSTRUCTIONS
No medication changes at this time. Continue remote monitoring of ICD every 91 days. Follow-up with Dr Toshia Lr office in 3 months.

## 2022-02-23 ENCOUNTER — TELEPHONE (OUTPATIENT)
Dept: NON INVASIVE DIAGNOSTICS | Age: 47
End: 2022-02-23

## 2022-02-23 NOTE — TELEPHONE ENCOUNTER
----- Message from Júnior Moura DO sent at 2/22/2022 10:30 PM EST -----  Regarding: echo  Please schedule for echo. Follow-up with me in ~3 months after echo completed.     -TB

## 2022-03-03 RX ORDER — FUROSEMIDE 20 MG/1
TABLET ORAL
Qty: 90 TABLET | Refills: 3 | Status: SHIPPED | OUTPATIENT
Start: 2022-03-03

## 2022-03-14 ENCOUNTER — TELEPHONE (OUTPATIENT)
Dept: CARDIOLOGY | Age: 47
End: 2022-03-14

## 2022-12-30 ENCOUNTER — TELEPHONE (OUTPATIENT)
Dept: NON INVASIVE DIAGNOSTICS | Age: 47
End: 2022-12-30

## 2023-01-03 NOTE — TELEPHONE ENCOUNTER
Spoke to the patient and told her that we got word that we are accepting [employer]. She agreed to schedule appointment.  Scheduled with TB 01/18/2023 @ 9:00 AM
The patient stated that she is not having any issues right now. Due to [facility] not accepting [employer] she wants to wait to see if they come to a agreement about insurance before she decides to try and find another doctor.
No